# Patient Record
Sex: MALE | Race: WHITE | NOT HISPANIC OR LATINO | Employment: OTHER | URBAN - METROPOLITAN AREA
[De-identification: names, ages, dates, MRNs, and addresses within clinical notes are randomized per-mention and may not be internally consistent; named-entity substitution may affect disease eponyms.]

---

## 2017-12-20 ENCOUNTER — DOCUMENTATION ONLY (OUTPATIENT)
Dept: OTHER | Facility: CLINIC | Age: 82
End: 2017-12-20

## 2017-12-20 ENCOUNTER — OFFICE VISIT (OUTPATIENT)
Dept: UROLOGY | Facility: CLINIC | Age: 82
End: 2017-12-20
Payer: MEDICARE

## 2017-12-20 VITALS
HEART RATE: 75 BPM | HEIGHT: 72 IN | SYSTOLIC BLOOD PRESSURE: 112 MMHG | OXYGEN SATURATION: 97 % | DIASTOLIC BLOOD PRESSURE: 62 MMHG | WEIGHT: 212 LBS | BODY MASS INDEX: 28.71 KG/M2

## 2017-12-20 DIAGNOSIS — N40.1 BENIGN PROSTATIC HYPERPLASIA WITH POST-VOID DRIBBLING: Primary | ICD-10-CM

## 2017-12-20 DIAGNOSIS — N39.43 BENIGN PROSTATIC HYPERPLASIA WITH POST-VOID DRIBBLING: Primary | ICD-10-CM

## 2017-12-20 PROBLEM — Z71.89 ACP (ADVANCE CARE PLANNING): Chronic | Status: ACTIVE | Noted: 2017-12-20

## 2017-12-20 LAB
ALBUMIN UR-MCNC: NEGATIVE MG/DL
APPEARANCE UR: CLEAR
BILIRUB UR QL STRIP: NEGATIVE
COLOR UR AUTO: YELLOW
GLUCOSE UR STRIP-MCNC: NEGATIVE MG/DL
HGB UR QL STRIP: ABNORMAL
KETONES UR STRIP-MCNC: NEGATIVE MG/DL
LEUKOCYTE ESTERASE UR QL STRIP: NEGATIVE
NITRATE UR QL: NEGATIVE
PH UR STRIP: 6 PH (ref 5–7)
RESIDUAL VOLUME (RV) (EXTERNAL): 50
SOURCE: ABNORMAL
SP GR UR STRIP: 1.02 (ref 1–1.03)
UROBILINOGEN UR STRIP-ACNC: 0.2 EU/DL (ref 0.2–1)

## 2017-12-20 PROCEDURE — 99212 OFFICE O/P EST SF 10 MIN: CPT | Mod: 25 | Performed by: UROLOGY

## 2017-12-20 PROCEDURE — 81003 URINALYSIS AUTO W/O SCOPE: CPT | Performed by: UROLOGY

## 2017-12-20 PROCEDURE — 51798 US URINE CAPACITY MEASURE: CPT | Performed by: UROLOGY

## 2017-12-20 RX ORDER — ASPIRIN 81 MG/1
TABLET ORAL
COMMUNITY
Start: 2006-12-07

## 2017-12-20 RX ORDER — FINASTERIDE 5 MG/1
TABLET, FILM COATED ORAL
COMMUNITY
Start: 2017-10-02

## 2017-12-20 RX ORDER — PRAVASTATIN SODIUM 10 MG
10 TABLET ORAL
COMMUNITY
Start: 2017-10-02

## 2017-12-20 ASSESSMENT — PAIN SCALES - GENERAL: PAINLEVEL: NO PAIN (0)

## 2017-12-20 NOTE — MR AVS SNAPSHOT
"              After Visit Summary   2017    Ramírez Cotto    MRN: [id number]           Patient Information     Date Of Birth          1935        Visit Information        Provider Department      2017 8:20 AM Augie Talley MD [facility] [place]        Today's Diagnoses     Benign prostatic hyperplasia with post-void dribbling    -  1       Follow-ups after your visit        Follow-up notes from your care team     Return in about 1 year (around 2018) for Physical Exam.      Who to contact     If you have questions or need follow up information about today's clinic visit or your schedule please contact [facility] UROLOGY [facility] directly at [phone].  Normal or non-critical lab and imaging results will be communicated to you by MyChart, letter or phone within 4 business days after the clinic has received the results. If you do not hear from us within 7 days, please contact the clinic through [employer] or phone. If you have a critical or abnormal lab result, we will notify you by phone as soon as possible.  Submit refill requests through [employer] or call your pharmacy and they will forward the refill request to us. Please allow 3 business days for your refill to be completed.          Additional Information About Your Visit        MyChart Information     [employer] lets you send messages to your doctor, view your test results, renew your prescriptions, schedule appointments and more. To sign up, go to [email or web address] . Click on \"Log in\" on the left side of the screen, which will take you to the Welcome page. Then click on \"Sign up Now\" on the right side of the page.     You will be asked to enter the access code listed below, as well as some personal information. Please follow the directions to create your username and password.     Your access code is: [email or web address]  Expires: 3/20/2018  8:29 AM     Your access code will  in " 90 days. If you need help or a new code, please call your [place] clinic or [phone].        Care EveryWhere ID     This is your Care EveryWhere ID. This could be used by other organizations to access your [place] medical records  [id number]        Your Vitals Were     Pulse Height Pulse Oximetry BMI (Body Mass Index)          75 1.829 m (6') 97% 28.75 kg/m2         Blood Pressure from Last 3 Encounters:   12/20/17 112/62    Weight from Last 3 Encounters:   12/20/17 96.2 kg (212 lb)              We Performed the Following     UA without Microscopic        Primary Care Provider Office Phone # Fax #    Pavel Robles [phone]       [facility] [address]  [facility] [address]        Equal Access to Services     [facility]: Hadii aad ku hadasho Soomaali, waaxda luqadaha, qaybta kaalmada adeegyada, waxay katelinin hayaan delvis garcia . So [facility] [phone].    ATENCIÓN: Si habla español, tiene a sanchez disposición servicios gratuitos de asistencia lingüística. Yamel al [phone].    We comply with applicable federal civil rights laws and [place] laws. We do not discriminate on the basis of race, color, national origin, age, disability, sex, sexual orientation, or gender identity.            Thank you!     Thank you for choosing [facility]OLOG[facility] CLINIC [place]  for your care. Our goal is always to provide you with excellent care. Hearing back from our patients is one way we can continue to improve our services. Please take a few minutes to complete the written survey that you may receive in the mail after your visit with us. Thank you!             Your Updated Medication List - Protect others around you: Learn how to safely use, store and throw away your medicines at www.disposemymeds.org.          This list is accurate as of: 12/20/17  8:29 AM.  Always use your most recent med list.                   Brand Name Dispense Instructions for use  Diagnosis    aspirin EC 81 MG EC tablet           finasteride 5 MG tablet    PROSCAR     TAKE 1 TABLET BY MOUTH EVERY MORNING. CALL TO SCHEDULE ANNUAL PHYSICAL        pravastatin 10 MG tablet    PRAVACHOL     Take 10 mg by mouth

## 2017-12-20 NOTE — PROGRESS NOTES
Ramírez Cotto is a pleasant 82-year-old male with BPH and stable urinary symptoms. He was seen 2 years ago with a normal digital rectal exam and he had a postvoid residual of 55 cc. Today his postvoid residual is 50 cc and his symptoms are actually improved with nocturia only one time. He does notice some postvoid dribbling which is common. He denies any dysuria or hematuria.  Urinalysis is normal.  Other past medical history: No change in medications  Allergies: Oxycodone  Family history: No prostate cancer  Exam: Normal appearance, normal vital signs, alert and oriented, normocephalic, normal respirations, neuro grossly intact. Normal sphincter tone, no rectal mass or impaction, small benign prostate, normal seminal vesicles  Assessment: Small benign prostate, emptying bladder well. Discussed follow-up, treatment options for BPH should his voiding symptoms change  Plan: See me yearly for urinalysis, postvoid residual and SARAH. No PSA unless palpable abnormality

## 2017-12-20 NOTE — LETTER
12/20/2017       RE: Ramírez Cotto  [address]  [facility] [address]     Dear Colleague,    Thank you for referring your patient, Ramírez Cotto, to the [facility] UROLOGY CLINIC [place] at [facility]. Please see a copy of my visit note below.    Ramírez Cotto is a pleasant 82-year-old male with BPH and stable urinary symptoms. He was seen 2 years ago with a normal digital rectal exam and he had a postvoid residual of 55 cc. Today his postvoid residual is 50 cc and his symptoms are actually improved with nocturia only one time. He does notice some postvoid dribbling which is common. He denies any dysuria or hematuria.  Urinalysis is normal.  Other past medical history: No change in medications  Allergies: Oxycodone  Family history: No prostate cancer  Exam: Normal appearance, normal vital signs, alert and oriented, normocephalic, normal respirations, neuro grossly intact. Normal sphincter tone, no rectal mass or impaction, small benign prostate, normal seminal vesicles  Assessment: Small benign prostate, emptying bladder well. Discussed follow-up, treatment options for BPH should his voiding symptoms change  Plan: See me yearly for urinalysis, postvoid residual and SARAH. No PSA unless palpable abnormality    Again, thank you for allowing me to participate in the care of your patient.      Sincerely,    Augie Talley MD

## 2017-12-20 NOTE — NURSING NOTE
Chief Complaint   Patient presents with     Benign Prostatic Hypertrophy     Patient here today for Post Void Dribbling he has had a PVR today         Ananya Santana MA

## 2018-01-17 PROBLEM — E78.5 HYPERLIPIDEMIA: Status: ACTIVE | Noted: 2018-01-17

## 2018-01-17 PROBLEM — M19.90 OSTEOARTHRITIS: Status: ACTIVE | Noted: 2018-01-17

## 2018-01-17 RX ORDER — FINASTERIDE 5 MG/1
5 TABLET, FILM COATED ORAL
COMMUNITY
Start: 2018-01-03

## 2018-02-02 ENCOUNTER — DOCUMENTATION ONLY (OUTPATIENT)
Dept: FAMILY MEDICINE | Facility: OTHER | Age: 83
End: 2018-02-02

## 2018-12-21 DIAGNOSIS — N40.0 BPH (BENIGN PROSTATIC HYPERPLASIA): Primary | ICD-10-CM

## 2018-12-26 ENCOUNTER — OFFICE VISIT (OUTPATIENT)
Dept: UROLOGY | Facility: CLINIC | Age: 83
End: 2018-12-26
Payer: MEDICARE

## 2018-12-26 VITALS
SYSTOLIC BLOOD PRESSURE: 142 MMHG | WEIGHT: 213 LBS | BODY MASS INDEX: 28.85 KG/M2 | DIASTOLIC BLOOD PRESSURE: 86 MMHG | HEIGHT: 72 IN | HEART RATE: 71 BPM | OXYGEN SATURATION: 95 %

## 2018-12-26 DIAGNOSIS — R39.12 BENIGN PROSTATIC HYPERPLASIA WITH WEAK URINARY STREAM: ICD-10-CM

## 2018-12-26 DIAGNOSIS — N40.1 BENIGN PROSTATIC HYPERPLASIA WITH WEAK URINARY STREAM: ICD-10-CM

## 2018-12-26 LAB
ALBUMIN UR-MCNC: NEGATIVE MG/DL
APPEARANCE UR: CLEAR
BILIRUB UR QL STRIP: NEGATIVE
COLOR UR AUTO: YELLOW
GLUCOSE UR STRIP-MCNC: NEGATIVE MG/DL
HGB UR QL STRIP: NEGATIVE
KETONES UR STRIP-MCNC: NEGATIVE MG/DL
LEUKOCYTE ESTERASE UR QL STRIP: NEGATIVE
NITRATE UR QL: NEGATIVE
PH UR STRIP: 6 PH (ref 5–7)
RESIDUAL VOLUME (RV) (EXTERNAL): 105
SOURCE: NORMAL
SP GR UR STRIP: 1.01 (ref 1–1.03)
UROBILINOGEN UR STRIP-ACNC: 0.2 EU/DL (ref 0.2–1)

## 2018-12-26 PROCEDURE — 51798 US URINE CAPACITY MEASURE: CPT | Performed by: UROLOGY

## 2018-12-26 PROCEDURE — 99213 OFFICE O/P EST LOW 20 MIN: CPT | Mod: 25 | Performed by: UROLOGY

## 2018-12-26 PROCEDURE — 81003 URINALYSIS AUTO W/O SCOPE: CPT | Performed by: UROLOGY

## 2018-12-26 RX ORDER — TAMSULOSIN HYDROCHLORIDE 0.4 MG/1
0.4 CAPSULE ORAL DAILY
Qty: 30 CAPSULE | Refills: 11 | Status: SHIPPED | OUTPATIENT
Start: 2018-12-26 | End: 2019-10-26

## 2018-12-26 ASSESSMENT — MIFFLIN-ST. JEOR: SCORE: 1704.16

## 2018-12-26 ASSESSMENT — PAIN SCALES - GENERAL: PAINLEVEL: NO PAIN (0)

## 2018-12-26 NOTE — LETTER
12/26/2018       RE: Ramírez Cotto  [address]  [facility] [address]     Dear Colleague,    Thank you for referring your patient, Ramíerz Cotto, to the [facility] UROLOGY CLINIC [place] at [facility]. Please see a copy of my visit note below.    Ramírez Cotto is a very pleasant 83-year-old male with BPH. He  Has been on finasteride for some time and his voiding symptoms are largely unchanged. He may have a bit more urgency and frequencyin the same nocturia. He denies any dysuria or hematuria. His postvoid residual today is 105 ml compared to 50 ml a year ago.  There is no family history of prostate cancer  Other past medical history:mumps, nonsmoker, high cholesterol  Medications: Low-dose aspirin, finasteride, Pravachol  Allergies: Oxycodone  Exam: Alert and oriented, normal appearance, normal vital signs. Normocephalic, normal respirations, neuro grossly intact. Normal sphincter tone, no rectal mass or impaction, benign feeling prostate, seminal vesicles not palpable  Assessment: BPH with slightly higher postvoid residual this year No evidence for prostate cancer  Plan: Trial of tamsulosin 0.4 mg daily-take 30 minutes after evening meal. Try for 2-3 weeks and continue if symptoms improved  See me yearly for urinalysis, PVR and SARAH. No PSA unless palpable abnormality    Again, thank you for allowing me to participate in the care of your patient.      Sincerely,    Augie Talley MD

## 2018-12-26 NOTE — PROGRESS NOTES
Ramírez Cotto is a very pleasant 83-year-old male with BPH. He  Has been on finasteride for some time and his voiding symptoms are largely unchanged. He may have a bit more urgency and frequencyin the same nocturia. He denies any dysuria or hematuria. His postvoid residual today is 105 ml compared to 50 ml a year ago.  There is no family history of prostate cancer  Other past medical history:mumps, nonsmoker, high cholesterol  Medications: Low-dose aspirin, finasteride, Pravachol  Allergies: Oxycodone  Exam: Alert and oriented, normal appearance, normal vital signs. Normocephalic, normal respirations, neuro grossly intact. Normal sphincter tone, no rectal mass or impaction, benign feeling prostate, seminal vesicles not palpable  Assessment: BPH with slightly higher postvoid residual this year No evidence for prostate cancer  Plan: Trial of tamsulosin 0.4 mg daily-take 30 minutes after evening meal. Try for 2-3 weeks and continue if symptoms improved  See me yearly for urinalysis, PVR and SARAH. No PSA unless palpable abnormality

## 2018-12-26 NOTE — NURSING NOTE
Chief Complaint   Patient presents with     Clinic Care Coordination - Follow-up     Pt here for annual follow-up   PVR is 105mL  Carolina Allison CMA

## 2019-10-26 DIAGNOSIS — R39.12 BENIGN PROSTATIC HYPERPLASIA WITH WEAK URINARY STREAM: ICD-10-CM

## 2019-10-26 DIAGNOSIS — N40.1 BENIGN PROSTATIC HYPERPLASIA WITH WEAK URINARY STREAM: ICD-10-CM

## 2019-10-28 RX ORDER — TAMSULOSIN HYDROCHLORIDE 0.4 MG/1
0.4 CAPSULE ORAL DAILY
Qty: 90 CAPSULE | Refills: 0 | Status: SHIPPED | OUTPATIENT
Start: 2019-10-28 | End: 2020-02-11

## 2020-02-03 DIAGNOSIS — N40.1 BENIGN PROSTATIC HYPERPLASIA WITH WEAK URINARY STREAM: ICD-10-CM

## 2020-02-03 DIAGNOSIS — R39.12 BENIGN PROSTATIC HYPERPLASIA WITH WEAK URINARY STREAM: ICD-10-CM

## 2020-02-03 RX ORDER — TAMSULOSIN HYDROCHLORIDE 0.4 MG/1
0.4 CAPSULE ORAL DAILY
Qty: 90 CAPSULE | Refills: 0 | OUTPATIENT
Start: 2020-02-03 | End: 2021-02-02

## 2020-02-11 DIAGNOSIS — N40.1 BENIGN PROSTATIC HYPERPLASIA WITH WEAK URINARY STREAM: ICD-10-CM

## 2020-02-11 DIAGNOSIS — R39.12 BENIGN PROSTATIC HYPERPLASIA WITH WEAK URINARY STREAM: ICD-10-CM

## 2020-02-11 RX ORDER — TAMSULOSIN HYDROCHLORIDE 0.4 MG/1
0.4 CAPSULE ORAL DAILY
Qty: 30 CAPSULE | Refills: 0 | Status: SHIPPED | OUTPATIENT
Start: 2020-02-11 | End: 2020-06-12

## 2020-05-12 ENCOUNTER — TELEPHONE (OUTPATIENT)
Dept: UROLOGY | Facility: CLINIC | Age: 85
End: 2020-05-12

## 2020-05-12 DIAGNOSIS — N40.0 BPH (BENIGN PROSTATIC HYPERPLASIA): Primary | ICD-10-CM

## 2020-05-12 RX ORDER — TAMSULOSIN HYDROCHLORIDE 0.4 MG/1
0.4 CAPSULE ORAL DAILY
Qty: 30 CAPSULE | Refills: 1 | Status: SHIPPED | OUTPATIENT
Start: 2020-05-12 | End: 2022-04-26

## 2020-05-12 NOTE — TELEPHONE ENCOUNTER
[facility] Call Center    Phone Message    May a detailed message be left on voicemail: yes     Reason for Call: Other: Patient's wife called said patient need a refill on the Tamsulosin,please send script to [facility] AT [address]     Action Taken: Other: p Urology    Travel Screening: Not Applicable

## 2020-06-12 DIAGNOSIS — N40.1 BENIGN PROSTATIC HYPERPLASIA WITH WEAK URINARY STREAM: ICD-10-CM

## 2020-06-12 DIAGNOSIS — R39.12 BENIGN PROSTATIC HYPERPLASIA WITH WEAK URINARY STREAM: ICD-10-CM

## 2020-06-12 RX ORDER — TAMSULOSIN HYDROCHLORIDE 0.4 MG/1
CAPSULE ORAL
Qty: 30 CAPSULE | Refills: 0 | Status: SHIPPED | OUTPATIENT
Start: 2020-06-12 | End: 2020-07-08

## 2020-06-16 DIAGNOSIS — R39.12 BENIGN PROSTATIC HYPERPLASIA WITH WEAK URINARY STREAM: Primary | ICD-10-CM

## 2020-06-16 DIAGNOSIS — N40.1 BENIGN PROSTATIC HYPERPLASIA WITH WEAK URINARY STREAM: Primary | ICD-10-CM

## 2020-06-17 ENCOUNTER — OFFICE VISIT (OUTPATIENT)
Dept: UROLOGY | Facility: CLINIC | Age: 85
End: 2020-06-17
Payer: COMMERCIAL

## 2020-06-17 VITALS
SYSTOLIC BLOOD PRESSURE: 104 MMHG | OXYGEN SATURATION: 97 % | HEIGHT: 72 IN | DIASTOLIC BLOOD PRESSURE: 70 MMHG | HEART RATE: 72 BPM | WEIGHT: 210 LBS | BODY MASS INDEX: 28.44 KG/M2

## 2020-06-17 DIAGNOSIS — N40.1 BENIGN PROSTATIC HYPERPLASIA WITH WEAK URINARY STREAM: ICD-10-CM

## 2020-06-17 DIAGNOSIS — R39.12 BENIGN PROSTATIC HYPERPLASIA WITH WEAK URINARY STREAM: ICD-10-CM

## 2020-06-17 LAB
ALBUMIN UR-MCNC: NEGATIVE MG/DL
APPEARANCE UR: CLEAR
BILIRUB UR QL STRIP: NEGATIVE
COLOR UR AUTO: YELLOW
GLUCOSE UR STRIP-MCNC: NEGATIVE MG/DL
HGB UR QL STRIP: NEGATIVE
KETONES UR STRIP-MCNC: NEGATIVE MG/DL
LEUKOCYTE ESTERASE UR QL STRIP: NEGATIVE
NITRATE UR QL: NEGATIVE
PH UR STRIP: 7 PH (ref 5–7)
RESIDUAL VOLUME (RV) (EXTERNAL): 35
SOURCE: NORMAL
SP GR UR STRIP: 1.02 (ref 1–1.03)
UROBILINOGEN UR STRIP-ACNC: 0.2 EU/DL (ref 0.2–1)

## 2020-06-17 PROCEDURE — 81003 URINALYSIS AUTO W/O SCOPE: CPT | Performed by: UROLOGY

## 2020-06-17 PROCEDURE — 99212 OFFICE O/P EST SF 10 MIN: CPT | Mod: 25 | Performed by: UROLOGY

## 2020-06-17 PROCEDURE — 51798 US URINE CAPACITY MEASURE: CPT | Performed by: UROLOGY

## 2020-06-17 ASSESSMENT — MIFFLIN-ST. JEOR: SCORE: 1685.55

## 2020-06-17 ASSESSMENT — PAIN SCALES - GENERAL: PAINLEVEL: NO PAIN (0)

## 2020-06-17 NOTE — PROGRESS NOTES
Mr. Cotto is a pleasant 84-year-old male with BPH.  His postvoid residual is only 35 cc today.  He has been on Flomax the past year or more.  He has occasional urgency but no incontinence.  Urinalysis is normal.  Past medical history: Non-smoker, elevated cholesterol  No family history of prostate cancer  Medications: Baby aspirin, finasteride, Flomax, Pravachol  Allergies: Oxycodone  Review of systems: No dysuria or hematuria  Exam: Alert and oriented, normal appearance, normal vital signs.  Normal respirations, neuro grossly intact.  Normal sphincter tone, no rectal mass or impaction, benign prostate, normal seminal vesicles  Assessment: BPH, no sign of prostate cancer  Plan: Yearly urinalysis and SARAH- he can see Gerry Cantu MD next year in my absence

## 2020-06-17 NOTE — LETTER
6/17/2020       RE: Ramírez Cotto  [address]  [place] [address]     Dear Colleague,    Thank you for referring your patient, Ramírez Cotto, to the [facility]OLOGY CLINIC [place] at [facility]. Please see a copy of my visit note below.    Mr. Cotto is a pleasant 84-year-old male with BPH.  His postvoid residual is only 35 cc today.  He has been on Flomax the past year or more.  He has occasional urgency but no incontinence.  Urinalysis is normal.  Past medical history: Non-smoker, elevated cholesterol  No family history of prostate cancer  Medications: Baby aspirin, finasteride, Flomax, Pravachol  Allergies: Oxycodone  Review of systems: No dysuria or hematuria  Exam: Alert and oriented, normal appearance, normal vital signs.  Normal respirations, neuro grossly intact.  Normal sphincter tone, no rectal mass or impaction, benign prostate, normal seminal vesicles  Assessment: BPH, no sign of prostate cancer  Plan: Yearly urinalysis and SARAH- he can see Gerry Cantu MD next year in my absence    Again, thank you for allowing me to participate in the care of your patient.      Sincerely,    Augie Talley MD

## 2020-06-17 NOTE — NURSING NOTE
Chief Complaint   Patient presents with     Clinic Care Coordination - Follow-up     BPH   Patient stated  sometimes he has urinary urgency.  Heidy Medina LPN

## 2020-07-08 DIAGNOSIS — N40.1 BENIGN PROSTATIC HYPERPLASIA WITH WEAK URINARY STREAM: ICD-10-CM

## 2020-07-08 DIAGNOSIS — R39.12 BENIGN PROSTATIC HYPERPLASIA WITH WEAK URINARY STREAM: ICD-10-CM

## 2020-07-08 RX ORDER — TAMSULOSIN HYDROCHLORIDE 0.4 MG/1
CAPSULE ORAL
Qty: 30 CAPSULE | Refills: 11 | Status: SHIPPED | OUTPATIENT
Start: 2020-07-08 | End: 2021-09-24

## 2021-07-22 ENCOUNTER — TELEPHONE (OUTPATIENT)
Dept: UROLOGY | Facility: CLINIC | Age: 86
End: 2021-07-22

## 2021-07-22 DIAGNOSIS — R39.15 URINARY URGENCY: Primary | ICD-10-CM

## 2021-07-22 NOTE — TELEPHONE ENCOUNTER
[facility] Call Center    Phone Message    May a detailed message be left on voicemail: yes     Reason for Call: Other: Pt needing a UA ordered for appt with Dr. Cantu in October. Please call once ordered at . Pt also has [employer], if this needs to be addressed please discuss with Pt. Pt used to see Dr. Talley.      Action Taken: Message routed to:  [facility] Urology    Travel Screening: Not Applicable

## 2021-07-22 NOTE — TELEPHONE ENCOUNTER
Orders in . Explained to patient that we no longer participate  With [employer]  So his out of pocket expense will be more  Per Lizeth Chicas LPN

## 2021-09-24 DIAGNOSIS — R39.12 BENIGN PROSTATIC HYPERPLASIA WITH WEAK URINARY STREAM: ICD-10-CM

## 2021-09-24 DIAGNOSIS — N40.1 BENIGN PROSTATIC HYPERPLASIA WITH WEAK URINARY STREAM: ICD-10-CM

## 2021-09-24 RX ORDER — TAMSULOSIN HYDROCHLORIDE 0.4 MG/1
CAPSULE ORAL
Qty: 90 CAPSULE | Refills: 0 | Status: SHIPPED | OUTPATIENT
Start: 2021-09-24 | End: 2021-12-22

## 2021-12-22 DIAGNOSIS — R39.12 BENIGN PROSTATIC HYPERPLASIA WITH WEAK URINARY STREAM: ICD-10-CM

## 2021-12-22 DIAGNOSIS — N40.1 BENIGN PROSTATIC HYPERPLASIA WITH WEAK URINARY STREAM: ICD-10-CM

## 2021-12-22 RX ORDER — TAMSULOSIN HYDROCHLORIDE 0.4 MG/1
CAPSULE ORAL
Qty: 90 CAPSULE | Refills: 0 | Status: SHIPPED | OUTPATIENT
Start: 2021-12-22

## 2022-03-16 DIAGNOSIS — N40.1 BENIGN PROSTATIC HYPERPLASIA WITH WEAK URINARY STREAM: ICD-10-CM

## 2022-03-16 DIAGNOSIS — R39.12 BENIGN PROSTATIC HYPERPLASIA WITH WEAK URINARY STREAM: ICD-10-CM

## 2022-03-16 RX ORDER — TAMSULOSIN HYDROCHLORIDE 0.4 MG/1
CAPSULE ORAL
Qty: 90 CAPSULE | Refills: 0 | OUTPATIENT
Start: 2022-03-16

## 2022-04-26 ENCOUNTER — TELEPHONE (OUTPATIENT)
Dept: ONCOLOGY | Facility: CLINIC | Age: 87
End: 2022-04-26
Payer: COMMERCIAL

## 2022-04-26 ENCOUNTER — TELEPHONE (OUTPATIENT)
Dept: UROLOGY | Facility: CLINIC | Age: 87
End: 2022-04-26
Payer: COMMERCIAL

## 2022-04-26 DIAGNOSIS — N40.1 BENIGN PROSTATIC HYPERPLASIA WITH WEAK URINARY STREAM: ICD-10-CM

## 2022-04-26 DIAGNOSIS — N40.0 BPH (BENIGN PROSTATIC HYPERPLASIA): ICD-10-CM

## 2022-04-26 DIAGNOSIS — R39.12 BENIGN PROSTATIC HYPERPLASIA WITH WEAK URINARY STREAM: ICD-10-CM

## 2022-04-26 RX ORDER — TAMSULOSIN HYDROCHLORIDE 0.4 MG/1
0.4 CAPSULE ORAL DAILY
Qty: 90 CAPSULE | Refills: 1 | Status: SHIPPED | OUTPATIENT
Start: 2022-04-26

## 2022-04-26 RX ORDER — TAMSULOSIN HYDROCHLORIDE 0.4 MG/1
CAPSULE ORAL
Qty: 90 CAPSULE | Refills: 0 | OUTPATIENT
Start: 2022-04-26

## 2022-04-26 NOTE — TELEPHONE ENCOUNTER
[facility] Call Center    Phone Message    May a detailed message be left on voicemail: yes     Reason for Call: Medication Question or concern regarding medication   Prescription Clarification  Name of Medication: tamsulosin (FLOMAX) 0.4 MG capsule / finasteride (PROSCAR) 5 MG tablet / oxybutinin  Prescribing Provider: Alondra Urbina   Pharmacy: [facility]Y #[id number] [address] [facility]   What on the order needs clarification? Patient's spouse is calling to see if patient should be on all 3 medications at the same time.  Spouse mentioned he was taking the tamsulosin, finasteride and oxybutinin at this time.  Please reach out to spouse to clarify, thanks!          Action Taken: Other: Urology    Travel Screening: Not Applicable

## 2022-04-26 NOTE — TELEPHONE ENCOUNTER
Per DR. Cantu, okay for patient to be on all three medications. Informed patient's wife and also sent in refill as upcoming visit isn't until August. Wife had requested refill on this one as well.     Brittany Del Valle LPN

## 2022-04-26 NOTE — TELEPHONE ENCOUNTER
SEPIDEH and call back number to schedule an appt with Dr Varela in June and then cancel the Aug appt if they wished

## 2022-04-27 NOTE — TELEPHONE ENCOUNTER
LVM for patient to [place] [facility] [place].

## 2022-07-20 ENCOUNTER — VIRTUAL VISIT (OUTPATIENT)
Dept: FAMILY MEDICINE | Facility: OTHER | Age: 87
End: 2022-07-20
Attending: NURSE PRACTITIONER
Payer: COMMERCIAL

## 2022-07-20 DIAGNOSIS — U07.1 INFECTION DUE TO 2019 NOVEL CORONAVIRUS: Primary | ICD-10-CM

## 2022-07-20 PROCEDURE — 99202 OFFICE O/P NEW SF 15 MIN: CPT | Mod: 95 | Performed by: NURSE PRACTITIONER

## 2022-07-20 NOTE — NURSING NOTE
Chief Complaint   Patient presents with     Covid Concern     Tested positive today       Medication reconciliation completed.    FOOD SECURITY SCREENING QUESTIONS:    The next two questions are to help us understand your food security.  If you are feeling you need any assistance in this area, we have resources available to support you today.    Hunger Vital Signs:  Within the past 12 months we worried whether our food would run out before we got money to buy more. Never  Within the past 12 months the food we bought just didn't last and we didn't have money to get more. Never    Initial There were no vitals taken for this visit. Estimated body mass index is 28.48 kg/m  as calculated from the following:    Height as of 6/17/20: 1.829 m (6').    Weight as of 6/17/20: 95.3 kg (210 lb).     Called patient tested positive for covid today    Wants to be called at [phone].      Bronwyn Farfan LPN .......  7/20/2022  4:27 PM

## 2022-07-20 NOTE — PROGRESS NOTES
Ramírez is a 87 year old who is being evaluated via a billable telephone visit.      What phone number would you like to be contacted at? [phone]  How would you like to obtain your AVS? Mail a copy    Assessment & Plan   Problem List Items Addressed This Visit    None     Visit Diagnoses     Infection due to 2019 novel coronavirus    -  Primary    Relevant Medications    nirmatrelvir and ritonavir (PAXLOVID) therapy pack      Risk versus benefit of this medication, possible side effects and risk of rebound.  Patient desired to move forward with this medication to reduce the risk of severe COVID-19.  He understands that he needs to hold his pravastatin and tamsulosin while taking this medication for a total of 8 days due to contraindications.     Review of prior external note(s) from - Outside records from 5/17/2022  No LOS data to display   Time spent doing chart review, history and exam, documentation and further activities per the note         No follow-ups on file.    Reina Bansal NP  [facility]    Subjective   Ramírez is a 87 year old accompanied by his spouse, presenting for the following health issues:  Covid Concern (Tested positive today)      HPI       COVID-19 Symptom Review  How many days ago did these symptoms start? Yesterday , dry cough . Feeling pretty well overall, lots of phlegm. Chest a little tight. Fever 101.     Are any of the following symptoms significant for you?    New or worsening difficulty breathing? No    Worsening cough? Yes, I am coughing up mucus.    Fever or chills? Yes, the highest temperature was 101    Headache: No    Sore throat: No    Chest pain: No    Diarrhea: No    Body aches? No    What treatments has patient tried? none   Does patient live in a nursing home, group home, or shelter? No  Does patient have a way to get food/medications during quarantined? Yes, I have a friend or family member who can help me. Lives with wife. Wife is not unwell at this  time.     Usually goes to [facility] in [place]; here for summer at [facility].     Review of Systems   Constitutional, HEENT, cardiovascular, pulmonary, gi and gu systems are negative, except as otherwise noted.      Objective           Vitals:  No vitals were obtained today due to virtual visit.    Physical Exam   healthy, alert and no distress  PSYCH: Alert and oriented times 3; coherent speech, normal   rate and volume, able to articulate logical thoughts, able   to abstract reason, no tangential thoughts, no hallucinations   or delusions  His affect is normal  RESP: No cough, no audible wheezing, able to talk in full sentences  Remainder of exam unable to be completed due to telephone visits    Home test was positive.           Phone call duration: 9 minutes    .  ..

## 2022-10-27 DIAGNOSIS — N40.0 BPH (BENIGN PROSTATIC HYPERPLASIA): ICD-10-CM

## 2022-10-28 RX ORDER — TAMSULOSIN HYDROCHLORIDE 0.4 MG/1
CAPSULE ORAL
Qty: 90 CAPSULE | Refills: 1 | OUTPATIENT
Start: 2022-10-28

## 2022-11-07 DIAGNOSIS — N40.0 BPH (BENIGN PROSTATIC HYPERPLASIA): ICD-10-CM

## 2022-11-09 RX ORDER — TAMSULOSIN HYDROCHLORIDE 0.4 MG/1
CAPSULE ORAL
Qty: 90 CAPSULE | Refills: 1 | OUTPATIENT
Start: 2022-11-09

## 2023-09-12 ENCOUNTER — TRANSFERRED RECORDS (OUTPATIENT)
Dept: MULTI SPECIALTY CLINIC | Facility: CLINIC | Age: 88
End: 2023-09-12

## 2023-09-12 LAB
CHOLESTEROL (EXTERNAL): 158 MG/DL (ref 100–199)
CREATININE (EXTERNAL): 1.11 MG/DL (ref 0.7–1.2)
GFR ESTIMATED (EXTERNAL): 64 ML/MIN/1.73M2
GLUCOSE (EXTERNAL): 108 MG/DL (ref 70–99)
HDLC SERPL-MCNC: 52 MG/DL
LDL CHOLESTEROL CALCULATED (EXTERNAL): 91 MG/DL
NON HDL CHOLESTEROL (EXTERNAL): 106 MG/DL
TRIGLYCERIDES (EXTERNAL): 77 MG/DL